# Patient Record
Sex: MALE | Race: WHITE | NOT HISPANIC OR LATINO | Employment: UNEMPLOYED | URBAN - METROPOLITAN AREA
[De-identification: names, ages, dates, MRNs, and addresses within clinical notes are randomized per-mention and may not be internally consistent; named-entity substitution may affect disease eponyms.]

---

## 2022-10-30 ENCOUNTER — HOSPITAL ENCOUNTER (OUTPATIENT)
Facility: CLINIC | Age: 52
Setting detail: OBSERVATION
Discharge: HOME | End: 2022-11-01
Attending: EMERGENCY MEDICINE | Admitting: EMERGENCY MEDICINE
Payer: COMMERCIAL

## 2022-10-30 DIAGNOSIS — F30.10 MANIC BEHAVIOR (H): ICD-10-CM

## 2022-10-30 DIAGNOSIS — F20.0 PARANOID SCHIZOPHRENIA (H): ICD-10-CM

## 2022-10-30 DIAGNOSIS — G47.00 INSOMNIA, UNSPECIFIED TYPE: ICD-10-CM

## 2022-10-30 DIAGNOSIS — Z59.00 HOMELESS: ICD-10-CM

## 2022-10-30 PROBLEM — G44.209 TENSION TYPE HEADACHE: Status: ACTIVE | Noted: 2018-11-15

## 2022-10-30 PROBLEM — R45.851 SUICIDAL IDEATIONS: Status: ACTIVE | Noted: 2018-11-01

## 2022-10-30 PROBLEM — Q86.0 FETAL ALCOHOL SYNDROME: Status: ACTIVE | Noted: 2022-10-30

## 2022-10-30 PROBLEM — F81.9 BASIC LEARNING DISABILITY: Status: ACTIVE | Noted: 2018-11-02

## 2022-10-30 LAB
GLUCOSE BLDC GLUCOMTR-MCNC: 133 MG/DL (ref 70–99)
SARS-COV-2 RNA RESP QL NAA+PROBE: NEGATIVE

## 2022-10-30 PROCEDURE — C9803 HOPD COVID-19 SPEC COLLECT: HCPCS

## 2022-10-30 PROCEDURE — U0003 INFECTIOUS AGENT DETECTION BY NUCLEIC ACID (DNA OR RNA); SEVERE ACUTE RESPIRATORY SYNDROME CORONAVIRUS 2 (SARS-COV-2) (CORONAVIRUS DISEASE [COVID-19]), AMPLIFIED PROBE TECHNIQUE, MAKING USE OF HIGH THROUGHPUT TECHNOLOGIES AS DESCRIBED BY CMS-2020-01-R: HCPCS | Performed by: EMERGENCY MEDICINE

## 2022-10-30 PROCEDURE — 99285 EMERGENCY DEPT VISIT HI MDM: CPT | Mod: 25

## 2022-10-30 RX ORDER — NICOTINE 21 MG/24HR
1 PATCH, TRANSDERMAL 24 HOURS TRANSDERMAL EVERY EVENING
Status: DISCONTINUED | OUTPATIENT
Start: 2022-10-30 | End: 2022-11-01 | Stop reason: HOSPADM

## 2022-10-30 RX ORDER — TRAZODONE HYDROCHLORIDE 50 MG/1
50 TABLET, FILM COATED ORAL AT BEDTIME
Status: DISCONTINUED | OUTPATIENT
Start: 2022-10-30 | End: 2022-11-01 | Stop reason: HOSPADM

## 2022-10-30 SDOH — ECONOMIC STABILITY - HOUSING INSECURITY: HOMELESSNESS UNSPECIFIED: Z59.00

## 2022-10-30 ASSESSMENT — ENCOUNTER SYMPTOMS
FATIGUE: 1
SLEEP DISTURBANCE: 1
HALLUCINATIONS: 0

## 2022-10-30 ASSESSMENT — ACTIVITIES OF DAILY LIVING (ADL)
ADLS_ACUITY_SCORE: 33
ADLS_ACUITY_SCORE: 33

## 2022-10-31 PROCEDURE — G0378 HOSPITAL OBSERVATION PER HR: HCPCS

## 2022-10-31 PROCEDURE — 90791 PSYCH DIAGNOSTIC EVALUATION: CPT

## 2022-10-31 PROCEDURE — 250N000013 HC RX MED GY IP 250 OP 250 PS 637: Performed by: PSYCHIATRY & NEUROLOGY

## 2022-10-31 PROCEDURE — 250N000013 HC RX MED GY IP 250 OP 250 PS 637: Performed by: EMERGENCY MEDICINE

## 2022-10-31 PROCEDURE — 99220 PR INITIAL OBSERVATION CARE,LEVEL III: CPT | Performed by: PSYCHIATRY & NEUROLOGY

## 2022-10-31 RX ORDER — QUETIAPINE FUMARATE 200 MG/1
200 TABLET, FILM COATED ORAL AT BEDTIME
COMMUNITY
End: 2022-11-01

## 2022-10-31 RX ORDER — HYDROXYZINE PAMOATE 25 MG/1
25 CAPSULE ORAL EVERY 4 HOURS PRN
Status: DISCONTINUED | OUTPATIENT
Start: 2022-10-31 | End: 2022-11-01 | Stop reason: HOSPADM

## 2022-10-31 RX ORDER — QUETIAPINE 200 MG/1
TABLET, FILM COATED, EXTENDED RELEASE ORAL
COMMUNITY
Start: 2022-01-13 | End: 2022-10-31

## 2022-10-31 RX ORDER — ASPIRIN 81 MG/1
81 TABLET ORAL DAILY
Status: DISCONTINUED | OUTPATIENT
Start: 2022-10-31 | End: 2022-11-01 | Stop reason: HOSPADM

## 2022-10-31 RX ORDER — LANOLIN ALCOHOL/MO/W.PET/CERES
6 CREAM (GRAM) TOPICAL AT BEDTIME
COMMUNITY

## 2022-10-31 RX ORDER — BENZTROPINE MESYLATE 0.5 MG/1
0.5 TABLET ORAL 2 TIMES DAILY
Status: DISCONTINUED | OUTPATIENT
Start: 2022-10-31 | End: 2022-11-01 | Stop reason: HOSPADM

## 2022-10-31 RX ORDER — TRAZODONE HYDROCHLORIDE 100 MG/1
100 TABLET ORAL AT BEDTIME
COMMUNITY

## 2022-10-31 RX ORDER — HALOPERIDOL 5 MG/1
5 TABLET ORAL EVERY 6 HOURS PRN
Status: DISCONTINUED | OUTPATIENT
Start: 2022-10-31 | End: 2022-11-01 | Stop reason: HOSPADM

## 2022-10-31 RX ORDER — QUETIAPINE 400 MG/1
400 TABLET, FILM COATED, EXTENDED RELEASE ORAL AT BEDTIME
Status: DISCONTINUED | OUTPATIENT
Start: 2022-10-31 | End: 2022-11-01 | Stop reason: HOSPADM

## 2022-10-31 RX ORDER — PANTOPRAZOLE SODIUM 40 MG/1
40 TABLET, DELAYED RELEASE ORAL DAILY
COMMUNITY

## 2022-10-31 RX ORDER — QUETIAPINE 400 MG/1
TABLET, FILM COATED, EXTENDED RELEASE ORAL
COMMUNITY
Start: 2022-07-28 | End: 2022-10-31

## 2022-10-31 RX ORDER — OLANZAPINE 5 MG/1
5 TABLET ORAL EVERY 6 HOURS PRN
Status: DISCONTINUED | OUTPATIENT
Start: 2022-10-31 | End: 2022-10-31

## 2022-10-31 RX ORDER — METFORMIN HCL 500 MG
1000 TABLET, EXTENDED RELEASE 24 HR ORAL 2 TIMES DAILY WITH MEALS
COMMUNITY

## 2022-10-31 RX ORDER — OLANZAPINE 10 MG/1
20 TABLET ORAL AT BEDTIME
Status: DISCONTINUED | OUTPATIENT
Start: 2022-10-31 | End: 2022-10-31

## 2022-10-31 RX ORDER — QUETIAPINE FUMARATE 50 MG/1
50 TABLET, FILM COATED ORAL 4 TIMES DAILY PRN
COMMUNITY
Start: 2022-10-24

## 2022-10-31 RX ORDER — BENZTROPINE MESYLATE 1 MG/1
1 TABLET ORAL 2 TIMES DAILY
COMMUNITY

## 2022-10-31 RX ORDER — HALOPERIDOL 5 MG/1
10 TABLET ORAL 2 TIMES DAILY
COMMUNITY

## 2022-10-31 RX ORDER — CALCIUM CARBONATE 500 MG/1
2 TABLET, CHEWABLE ORAL EVERY 4 HOURS PRN
COMMUNITY

## 2022-10-31 RX ORDER — CLONAZEPAM 0.5 MG/1
0.5 TABLET ORAL 2 TIMES DAILY PRN
Status: DISCONTINUED | OUTPATIENT
Start: 2022-10-31 | End: 2022-11-01 | Stop reason: HOSPADM

## 2022-10-31 RX ADMIN — ASPIRIN 81 MG: 81 TABLET, COATED ORAL at 12:40

## 2022-10-31 RX ADMIN — TRAZODONE HYDROCHLORIDE 50 MG: 50 TABLET ORAL at 21:08

## 2022-10-31 RX ADMIN — QUETIAPINE FUMARATE 400 MG: 400 TABLET, EXTENDED RELEASE ORAL at 21:08

## 2022-10-31 RX ADMIN — BENZTROPINE MESYLATE 0.5 MG: 0.5 TABLET ORAL at 12:40

## 2022-10-31 RX ADMIN — NICOTINE 1 PATCH: 21 PATCH, EXTENDED RELEASE TRANSDERMAL at 12:40

## 2022-10-31 RX ADMIN — BENZTROPINE MESYLATE 0.5 MG: 0.5 TABLET ORAL at 20:46

## 2022-10-31 ASSESSMENT — ACTIVITIES OF DAILY LIVING (ADL)
ADLS_ACUITY_SCORE: 35

## 2022-10-31 NOTE — CONSULTS
"Diagnostic Evaluation Consultation  Crisis Assessment    Patient was assessed: In Person  Patient location: [facility]  Was a release of information signed: Yes. Providers included on the release: Psychiatrist - Ninoska James CNP ([facility])      Referral Data and Chief Complaint  Aguilar is a 52 year old, who uses he/him pronouns, and presents to the ED alone. Patient is referred to the ED by self. Patient is presenting to the ED for the following concerns: difficulty sleeping, hx of schizophrenia.      Informed Consent and Assessment Methods     Patient is his own guardian. Writer met with patient and explained the crisis assessment process, including applicable information disclosures and limits to confidentiality, assessed understanding of the process, and obtained consent to proceed with the assessment. Patient was observed to be able to participate in the assessment as evidenced by verbal consent and engagement. Assessment methods included conducting a formal interview with patient, review of medical records, collaboration with medical staff, and obtaining relevant collateral information from family and community providers when available..     Over the course of this crisis assessment provided reassurance, offered validation, engaged patient in problem solving and disposition planning and worked with patient on safety and aftercare planning. Patient's response to interventions was engaged.      Summary of Patient Situation  Aguilar presented to [facility] due to concerns of poor sleep, feeling \"uneasy,\" crying more frequently. He is currently homeless, had been staying in a hotel in [place] until 3 days ago 10/28/22. Since then he has been on street, \"wandering.\" Yesterday 10/30 he presented to [facility] ED for similar concerns and was discharged to [facility] crisis bed. Aguilar reported when he got to Re-Entry there was \"someone standing there\" and he could not go in. Aguilar was unable to " specify if this was a peer, a staff or potentially a hallucination. He ended up walking away and eventually presented to this ED. He is hoping to rest here and receive resources for [facility] case management.   No concerns for drug use. He denied current suicidal ideation, noted he has attempted suicide in the past (years ago), showed writer old scars on arms, but no current SI. He reported baseline auditory and visual hallucinations that are not currently bothersome to him, no command hallucinations.     Brief Psychosocial History  Aguilar is currently homeless. Minimal natural supports, denied having any family or friends that could be contacted for collateral.   No legal issues identified.   Per chart review, Aguilar has a hx of special ed classes in high school.   See inpt psych note from 11/2018 for further details.     Significant Clinical History  Per chart review, Aguilar has an extensive hx of residential tx, inpt psych admissions (pt reported 66 hospitalizations in 2018 note), hx of commitment to [facility], hx of living at group homes. Chart review also states Aguilar was born with hydrocephalus, but never had a shunt. It appears he moved to the [facility] from [place] around 4-5 years ago. See inpt psych note from 11/2018 for further details.     He is currently established with a psychiatrist at [facility], Ninoska James CNP. However, writer called [facility] and was told Aguilar has not seen Ninoska since December 2021, but has an appt with a new provider (Shital Jiménez) on 11/30/22 (virtual).      Collateral Information  Aguilar denied having any current natural supports, no emergency contact listed. Epic chart was reviewed.  Aguilar did sign RAQUEL for [facility] to confirm current psychiatry services. Writer obtained the following info:  Aguilar reported he is currently established with a psychiatrist at [facility], Ninoska James CNP.  However, writer called [facility] and was told Aguilar has not seen Ninoska since December 2021, but has an appt with a new provider (Shital Jiménez) on 11/30/22 (virtual).      Risk Assessment  ESS-6  1.a. Over the past 2 weeks, have you had thoughts of killing yourself? No  1.b. Have you ever attempted to kill yourself and, if yes, when did this last happen? Yes years ago (2006) via cutting forearm   2. Recent or current suicide plan? No   3. Recent or current intent to act on ideation? No  4. Lifetime psychiatric hospitalization? Yes  5. Pattern of excessive substance use? No  6. Current irritability, agitation, or aggression? No  Scoring note: BOTH 1a and 1b must be yes for it to score 1 point, if both are not yes it is zero. All others are 1 point per number. If all questions 1a/1b - 6 are no, risk is negligible. If one of 1a/1b is yes, then risk is mild. If either question 2 or 3, but not both, is yes, then risk is automatically moderate regardless of total score. If both 2 and 3 are yes, risk is automatically high regardless of total score.      Score: 1, mild risk      Does the patient have access to lethal means? No     Does the patient engage in non-suicidal self-injurious behavior (NSSI/SIB)? no     Does the patient have thoughts of harming others? No     Is the patient engaging in sexually inappropriate behavior?  no        Current Substance Abuse     Is there recent substance abuse? no     Was a urine drug screen or blood alcohol level obtained: No       Mental Status Exam     Affect: Appropriate   Appearance: Appropriate    Attention Span/Concentration: Attentive  Eye Contact: Engaged   Fund of Knowledge: Appropriate    Language /Speech Content: Fluent   Language /Speech Volume: Normal    Language /Speech Rate/Productions: Normal    Recent Memory: Intact   Remote Memory: Intact   Mood: Anxious    Orientation to Person: Yes    Orientation to Place: Yes   Orientation to Time of Day: Yes     Orientation to Date: Yes    Situation (Do they understand why they are here?): Yes    Psychomotor Behavior: Normal    Thought Content: Clear and Hallucinations (at baseline)  Thought Form: Intact      History of commitment: Yes inpt note from 2018 indicates hx of commitment       Medication    Psychotropic medications: Yes. Pt is currently taking see MAR. Medication compliant: No: ran out of medications, looking for refill. Recent medication changes: No    Current Care Team    Primary Care Provider: No  Psychiatrist: Yes. Name: Shital Jiménez CNP. Location: [facility]. Date of last visit: new provider. Frequency: unknown. Perceived helpfulness: unknown.  Therapist: No  : No  CTSS or ARMHS: No  ACT Team: No  Other: No      Diagnosis    295.90  (F20.9) Schizophrenia - by history    Clinical Summary and Substantiation of Recommendations    A lower level of care has been unsuccessful in treating and stabilizing patient s mental health symptoms. Patient will remain on [facility] unit under observation for continued monitoring, treatment and therapeutic intervention of mental health symptoms. Observation at [facility] could help mitigate the need for a more restrictive level of care in an inpatient setting.    Aguilar would benefit from referral to case management. Possibly discharge to crisis bed tomorrow 11/1 for further stabilization and help with CM referral. Pt to follow up with [facility] for established psychiatry services.   Disposition    Recommended disposition: Upon discharge Medication Management and Other: discharge to crisis bed, referral to case management . Remain on obs tonight for stabilization.      Reviewed case and recommendations with attending provider. Attending Name: yes, Darryn Lima MD       Attending concurs with disposition: Yes       Patient concurs with disposition: Yes       Guardian concurs with disposition: NA      Final disposition: obs      Assessment Details    Patient  interview started at: 8:00am and completed at: 8:20am.     Total duration spent on the patient case in minutes: 1.0 hrs      CPT code(s) utilized: 97740 - Psychotherapy for Crisis - 60 (30-74*) min       [place] ISRAEL Jade  DEC - Triage & Transition Services  Callback: [phone]      Aftercare Plan    Follow up with established providers and supports as scheduled. Continue taking medications as prescribed. Abstain from drugs and alcohol. Utilize your [facility] crisis team as needed. They are available 24/7. Contact information is listed below.     Psychiatry  You have an appt with Shital Jiménez CNP at [facility] on 11/30. Shital works out of the [facility], but is able to see you via video appt. You should receive an email with instructions on how to access this appt.       CRISIS BEDS    [facility]  [facility]  [address] [place] [address]/[facility]  (p) [phone]     [facility]  [facility]  [address] [place] [address]/[facility]  (p) [phone]     [facility]  [address] [facility] [address]  (p) [phone]     [facility]   [address] [place] [address]  (p) [phone]    [facility]  [address] [place] [address]  Main Phone: [phone]  Crisis Phone: [phone]    [facility]  [facility]  [address] [place] [address]  Main Phone: [phone]  Crisis Phone: [phone]     Crisis residences are self-referred programs (meaning you can call them on your own and request to go there). The program integrates mental health, medical, and substance use care in a residential, 24-hour, supervised setting for up to 10 days.  Individuals can leave as they please. These services help individuals who are experiencing a mental health crisis or emergency.        If I am feeling unsafe or  I am in a crisis, I will:   Contact my established care providers   Call the National Suicide Prevention Lifeline: 618.283.8292   Go to the nearest emergency room   Call 911     Warning signs that I or other people might notice when a crisis is developing for me: changes to sleep, appetite or mood, increased anger, agitation or irritability, feeling depressed or hopeless, spending more time alone or talking less, increased crying, decreased productivity, seeing or hearing things that aren't there, thoughts of not wanting to live anymore or of actually killing myself, thoughts of hurting others    Things I am able to do on my own to cope or help me feel better: watching a favorite tv show or movie, listening to music I enjoy, going outside and breathing fresh air, going for a walk or exercising, taking a shower or bath, a cold or hot beverage, a healthy snack, drawing/coloring/painting, journaling, singing or dancing, deep breathing     I can try practicing square breathing when I begin to feel anxious - inhale through the nose for the count of 4 and the first line on the square. Exhale through the mouth for the count of 4 for the second line of the square. Repeat to complete the square. Repeat the square as many times as needed.    I can also use my five senses to practice mindfulness and grounding. What are five things I can see, four things I can hear, three things I can feel, two things I can smell, and one thing I can taste.     Things that I am able to do with others to cope or help me feel better: sometimes just talking or spending time with someone else, sharing a meal or having coffee, watching a movie or playing a game, going for a walk or exercising    I can also use community resources including mental health hotlines, [facility] crisis teams, or apps.     Things I can use or do for distraction: movies/tv, music, reading, games, drawing/coloring/painting or other art, essential oils, exercise,  "cleaning/organizing, puzzles, crossword puzzles, word search, Sudoku       I can also download a meditation or relaxation ruma, like Calm, Headspace, or Insight Timer (all three offer a free version)    Changes I can make to support my mental health and wellness: Attend scheduled mental health therapy and psychiatric appointments. Take my medications as prescribed. Maintain a daily schedule/routine. Abstain from all mood altering substances, including drugs, alcohol, or medications not currently prescribed to me. Implement a self-care routine.      People in my life that I can ask for help: friends or family, trusted teachers/staff/colleagues, trusted members of my community or place of [facility], mental health crisis lines, or 911    Your [facility] has a mental health crisis team you can call 24/7: [facility], [phone]    Other things that are important when I m in crisis: to remember that the feelings I am having right now are temporary, and it won't feel like this forever, and that it is okay and important to ask for help    Crisis Lines  Crisis Text Line  Text 721392  You will be connected with a trained live crisis counselor to provide support.    Por espanol, texto  ANA LAURA a 567527 o texto a 442-AYUDAME en WhatsApp    National Hope Line  1.800.SUICIDE [3800414]      Community Resources  Fast Tracker  Linking people to mental health and substance use disorder resources  fasttrackermn.org     [place] Mental Health Warm Line  Peer to peer support  Monday thru Saturday, 12 pm to 10 pm  170.073.0625 or 0.016.924.6171  Text \"Support\" to 72614    National [place] on Mental Illness (NI)  648.298.9702 or 1.888.NI.HELPS      Mental Health Apps  My3  https://my3app.org/    VirtualHopeBox  https://[employer].org/apps/virtual-hope-box/      Additional Information  Today you were seen by a licensed mental health professional through Triage and Transition services, Behavioral Healthcare Providers " ([facility])  for a crisis assessment in the Emergency Department at [facility].  It is recommended that you follow up with your established providers (psychiatrist, mental health therapist, and/or primary care doctor - as relevant) as soon as possible. Coordinators from [facility] will be calling you in the next 24-48 hours to ensure that you have the resources you need.  You can also contact [facility] coordinators directly at [phone]. You may have been scheduled for or offered an appointment with a mental health provider. [facility] maintains an extensive network of licensed behavioral health providers to connect patients with the services they need.  We do not charge providers a fee to participate in our referral network.  We match patients with providers based on a patient's specific needs, insurance coverage, and location.  Our first effort will be to refer you to a provider within your care system, and will utilize providers outside your care system as needed.

## 2022-10-31 NOTE — PROVIDER NOTIFICATION
MD Notification     Notified Person: MD     Notified Person Name: [facility]     Notification Date/Time: 10/30/22 at 11:23 PM     Notification Interaction: Paged [facility] answering      Purpose of Notification: Requested [facility] admission orders     Orders Received:     Comments:

## 2022-10-31 NOTE — ED NOTES
Pt slept uneventfully through the night in recliner. No signs of distress noted. Respirations even and unlabored. Will continue to monitor.

## 2022-10-31 NOTE — ED PROVIDER NOTES
"  History   Chief Complaint:  Insomnia       The history is provided by the patient.      Aguilar Engel is a 52 year old male with history of type II diabetes, hyperlipidemia, and paranoid schizophrenia who presents with insomnia. Aguilar states that he has been unable to sleep for some time, and has been wandering around the streets at night as he is homeless. He describes an intense feeling of fatigue and mental fogginess, but denies any hallucinations or SI. He does note that he normally on Seroquel PRN for anxiety and it normally helps him sleep, but hasn't had access to it for awhile.      Review of Systems   Constitutional: Positive for fatigue.   Psychiatric/Behavioral: Positive for sleep disturbance. Negative for hallucinations and self-injury.        (+) Fogginess   All other systems reviewed and are negative.        Allergies:  Clozapine    Medications:  Seroquel  Clonazepam  Metformin  Sertraline    Past Medical History:     Tension headache  Paranoid schizophrenia  Learning disability  Suicidal ideation  Type II diabetes mellitus  GERD  Elevated LFTs  Hyperlipidemia  Borderline personality disorder  Alcohol abuse  Adjustment disorder  Fetal alcohol syndrome  Depression  SVT  Thrombocytosis  Hypercholesterolemia   H. pylori infection  Dilated aortic root     Past Surgical History:    Inguinal hernia repair  Appendectomy  Left arm fracture  Mole excision     Family History:    Cardiovascular disease - father  Diabetes - father  Hypercholesterolemia - father  Hypertension - mother    Social History:  Patient presents alone.  Patient is currently homeless.  Patient denies alcohol use.  Patient reports smoking one pack per day.  Patient is unaccompanied in the ED.    Physical Exam     Patient Vitals for the past 24 hrs:   BP Temp Temp src Pulse Resp SpO2 Height Weight   10/30/22 2303 131/78 98  F (36.7  C) Oral 53 16 98 % 1.626 m (5' 4\") 70.9 kg (156 lb 4.8 oz)   10/30/22 1929 (!) 142/85 97.8  F (36.6 " " C) Temporal 96 16 97 % 1.626 m (5' 4\") 72.6 kg (160 lb)       Physical Exam    Nursing note and vitals reviewed.  Constitutional:  Alert.  Appears comfortable.   HENT:    Mucous membranes are moist.  Eyes:    Conjunctivae are normal.  Nipples are equal and reactive.     Right eye exhibits no discharge. Left eye exhibits no discharge.   Cardiovascular:  Normal rate, regular rhythm.      Normal heart sounds.     No murmur heard.  Pulmonary/Chest:  Breath sounds clear. No respiratory distress.     No stridor.   Neurological:   Alert and appropriate. No focal weakness.  Gait is normal.  No tremor.  Skin:    Skin is warm and dry. No rash noted. No diaphoresis.   Psychiatric:   Patient has a very flat affect, poor eye contact.  He does not seem delusional or psychotic acutely.  Denies suicidal ideation or hallucinations.    Emergency Department Course     Laboratory:  Labs Ordered and Resulted from Time of ED Arrival to Time of ED Departure   GLUCOSE BY METER - Abnormal       Result Value    GLUCOSE BY METER POCT 133 (*)    COVID-19 VIRUS (CORONAVIRUS) BY PCR - Normal    SARS CoV2 PCR Negative          Emergency Department Course:  Mental Health Risk Assessment      PSS-3    Date and Time Over the past 2 weeks have you felt down, depressed, or hopeless? Over the past 2 weeks have you had thoughts of killing yourself? Have you ever attempted to kill yourself? When did this last happen? User   10/30/22 1931 yes yes yes more than 6 months ago [facility]      C-SSRS ([place])    Date and Time Q1 Wished to be Dead (Past Month) Q2 Suicidal Thoughts (Past Month) Q3 Suicidal Thought Method Q4 Suicidal Intent without Specific Plan Q5 Suicide Intent with Specific Plan Q6 Suicide Behavior (Lifetime) Within the Past 3 Months? RETIRED: Level of Risk per Screen Screening Not Complete User   10/30/22 6260 no no no no no yes -- -- -- AS              Suicide assessment completed by mental health (D.E.C., LCSW, etc.)    Reviewed:  I reviewed " nursing notes, vitals, past medical history and Care Everywhere    Assessments:  2139 I obtained history and examined the patient as noted above.     Interventions:  Ordered Nicotine patch 1 patch TD  Ordered Trazodone 50 mg PO    Disposition:  The patient was transferred to [facility].     Impression & Plan     Medical Decision Making:  Patient comes in with manic symptoms with not sleeping for 8 to 10 days and walking around consistently.  He wants help at this time.  Said he cannot sleep.  He is not suicidal or having hallucinations at this time.  [facility]  His blood glucose was 133 and he is apparently a type II diabetic on metformin.  He also smokes and wants a patch so this is ordered.  COVID is negative.  [facility] and they wanted him to have a dose of trazodone ordered for tonight so 50 mg is ordered for bedtime.  He will go to [facility] for further evaluation and management.      Diagnosis:    ICD-10-CM    1. Paranoid schizophrenia (H)  F20.0       2. Insomnia, unspecified type  G47.00       3. Manic behavior (H)  F30.10       4. Tobacco abuse  Z72.0             Scribe Disclosure:  I, Michelle Lutz, am serving as a scribe at 9:04 PM on 10/30/2022 to document services personally performed by Hue Hair MD based on my observations and the provider's statements to me.        Hue Hair MD  10/30/22 6559

## 2022-10-31 NOTE — ED NOTES
Pt has been quiet for the most part of the shift until late this afternoon when patient was heard talking to self and appears to be internally preoccupied.

## 2022-10-31 NOTE — PHARMACY-ADMISSION MEDICATION HISTORY
Pharmacy Medication History  Admission medication history interview status for the 10/30/2022  admission is complete. See EPIC admission navigator for prior to admission medications     Location of Interview: Outside patient room but on unit  Medication history sources: Pharmacy ([facility])    Significant changes made to the medication list:  All meds added except atorvastatin and loratadine    In the past week, patient estimated taking medication this percent of the time: less than 50% due to other    Additional medication history information:   It is unclear if Aguilar is taking any of his medications.  He was admitted at [facility] from August until he left AMA on 10/24/22.  [facility] filled his psych medications, but did not send him with ASA, atorvastatin or metformin per the pharmacists report. The list was updated to the best of my ability given the information available from the pharmacy.  There is not a discharge summary available to me in Care Everywhere.    Medication reconciliation completed by provider prior to medication history? No    Time spent in this activity: 30 minutes    Prior to Admission medications    Medication Sig Last Dose Taking? Auth Provider Long Term End Date   aspirin 81 MG EC tablet Take 1 tablet (81 mg) by mouth daily Unknown Yes Martin Schaeffer NP     atorvastatin (LIPITOR) 40 MG tablet Take 1 tablet (40 mg) by mouth At Bedtime Unknown Yes Martin Schaeffer NP Yes    benztropine (COGENTIN) 1 MG tablet Take 1 mg by mouth 2 times daily  Yes Unknown, Entered By History No    calcium carbonate (TUMS) 500 MG chewable tablet Take 2 chew tab by mouth every 4 hours as needed for heartburn  Yes Unknown, Entered By History     haloperidol (HALDOL) 5 MG tablet Take 10 mg by mouth 2 times daily  Yes Unknown, Entered By History No    loratadine (CLARITIN) 10 MG tablet Take 1 tablet (10 mg) by mouth daily Unknown Yes Martin Schaeffer NP     melatonin 3 MG tablet Take 6 mg by mouth  At Bedtime  Yes Unknown, Entered By History     metFORMIN (GLUCOPHAGE XR) 500 MG 24 hr tablet Take 1,000 mg by mouth 2 times daily (with meals)  Yes Unknown, Entered By History Yes    pantoprazole (PROTONIX) 40 MG EC tablet Take 40 mg by mouth daily  Yes Unknown, Entered By History     psyllium (METAMUCIL/KONSYL) 58.6 % powder Take 1 teaspoonful by mouth 3 times daily  Yes Unknown, Entered By History     QUEtiapine (SEROQUEL) 200 MG tablet Take 200 mg by mouth At Bedtime  Yes Unknown, Entered By History No    traZODone (DESYREL) 100 MG tablet Take 100 mg by mouth At Bedtime  Yes Unknown, Entered By History     QUEtiapine (SEROQUEL) 50 MG tablet Take 50 mg by mouth 4 times daily as needed   Reported, Patient No        The information provided in this note is only as accurate as the sources available at the time of update(s)

## 2022-10-31 NOTE — ED TRIAGE NOTES
Pt reports he hasnt slept in days, then says he hasnt slept in months, then says he hasnt slept in weeks. Pt takes seroquel. Pt denies feeling suicidal. Pt reports he hasnt seen his PCP for the insomnia   Triage Assessment     Row Name 10/30/22 1929       Triage Assessment (Adult)    Airway WDL WDL       Respiratory WDL    Respiratory WDL WDL       Skin Circulation/Temperature WDL    Skin Circulation/Temperature WDL WDL       Cardiac WDL    Cardiac WDL WDL       Peripheral/Neurovascular WDL    Peripheral Neurovascular WDL WDL       Cognitive/Neuro/Behavioral WDL    Cognitive/Neuro/Behavioral WDL WDL

## 2022-10-31 NOTE — ED PROVIDER NOTES
EmPATH Unit - Initial Psychiatric Observation Note  [facility] Emergency Department  Observation Initiation Date: Oct 30, 2022    Aguilar Engel MRN: [id number]   Age: 52 year old YOB: 1970     History     Chief Complaint   Patient presents with     Insomnia     HPI  Aguilar Engel is a 52 year old male with a past history notable for schizophrenia further complicated by homelessness who presents to the emergency department reporting heightened anxiety in the context of paranoia.  He had initially denied hallucinations.  He was determined to be medically stable and transferred to the EmPATH unit for psychiatric assessment.  On examination, the patient reports paranoid delusions that he is not safe in the community.  He feels as though unknown people are following him.  He alludes to the possibility of voices influencing these concerns although denied command hallucinations.  He denied suicidal and homicidal thoughts.  He has been inconsistent with taking his medications and was able to identify insomnia as a consequence.  He is requesting not to be sent to a crisis home due to paranoid concerns.  He would like to continue his stay on the unit to allow adequate improvement in his mood and thought process.    Past Medical History  No past medical history on file.  No past surgical history on file.  aspirin 81 MG EC tablet  atorvastatin (LIPITOR) 40 MG tablet  benztropine (COGENTIN) 0.5 MG tablet  clonazePAM (KLONOPIN) 0.5 MG tablet  haloperidol (HALDOL) 5 MG tablet  hydrOXYzine (VISTARIL) 25 MG capsule  loratadine (CLARITIN) 10 MG tablet  OLANZapine (ZYPREXA) 20 MG tablet  omeprazole (PRILOSEC) 20 MG capsule  zaleplon (SONATA) 10 MG capsule  alum & mag hydroxide-simethicone (MYLANTA ES/MAALOX  ES) 400-400-40 MG/5ML SUSP  emollient (VANICREAM) cream  psyllium (METAMUCIL) 28.3 % packet      Allergies   Allergen Reactions     Benadryl [Diphenhydramine]      Clozaril [Clozapine]      Family History  No  "family history on file.  Social History   Social History     Tobacco Use     Smoking status: Never   Substance Use Topics     Alcohol use: No     Drug use: No      Past medical history, past surgical history, medications, allergies, family history, and social history were reviewed with the patient. No additional pertinent items.       Review of Systems  A complete review of systems was performed with pertinent positives and negatives noted in the HPI, and all other systems negative.    Physical Examination   BP: (!) 142/85  Pulse: 96  Temp: 97.8  F (36.6  C)  Resp: 16  Height: 162.6 cm (5' 4\")  Weight: 72.6 kg (160 lb)  SpO2: 97 %    Physical Exam  General: Appears stated age.   Neuro: Alert and fully oriented. Extremities appear to demonstrate normal strength on visual inspection.   Integumentary/Skin: no rash visualized, normal color    Psychiatric Examination   Appearance: awake, alert  Attitude:  cooperative  Eye Contact:  poor   Mood:  anxious  Affect:  intensity is flat  Speech:  clear, coherent  Psychomotor Behavior:  tremor observed   Thought Process:  linear  Associations:  no loose associations  Thought Content:  no evidence of suicidal ideation or homicidal ideation, auditory hallucinations present and mild paranoia evident  Insight:  fair  Judgement:  fair  Oriented to:  time, person, and place  Attention Span and Concentration:  fair  Recent and Remote Memory:  fair  Language: able to name/identify objects without impairment  Fund of Knowledge: intact with awareness of current and past events    ED Course        Labs Ordered and Resulted from Time of ED Arrival to Time of ED Departure   GLUCOSE BY METER - Abnormal       Result Value    GLUCOSE BY METER POCT 133 (*)    COVID-19 VIRUS (CORONAVIRUS) BY PCR - Normal    SARS CoV2 PCR Negative         Assessments & Plan (with Medical Decision Making)   Patient presenting with decompensated state of his schizophrenia illness resulting in mild to moderate " paranoia and possible hallucinations. Nursing notes reviewed noting no acute issues.     I have reviewed the assessment completed by the [facility].     During the observation period, the patient did not require medications for agitation, and did not require restraints/seclusion for patient and/or provider safety.     The patient was found to have a psychiatric condition that would benefit from an observation stay in the emergency department for further psychiatric stabilization and/or coordination of a safe disposition. The observation plan includes serial assessments of psychiatric condition, potential administration of medications if indicated, further disposition pending the patient's psychiatric course during the monitoring period.     Preliminary diagnosis:    ICD-10-CM    1. Paranoid schizophrenia (H)  F20.0       2. Insomnia, unspecified type  G47.00       3. Manic behavior (H)  F30.10            Treatment Plan:  -Resume Seroquel XR 400mg nightly for reduction of psychosis  -Continue Haldol however changed to 5 mg twice a day as opposed to scheduled due to the patient reporting possible side effects leading to nonadherence.  -Hydroxyzine as needed for anxiety  -Enter to observation status and reassess tomorrow.    --  Darryn Lima MD   [facility] EMERGENCY DEPT  EmPATH Unit  10/30/2022        Darryn Lima MD  10/31/22 2254

## 2022-10-31 NOTE — ED NOTES
Pt is A/O x 3, woke up this morning with VSS, Pt appears to have some developmental delays as his story is coherent. Pt denies hallucinating to writer but admitted it to the [facility] staff. Pt ordered break fast. Pt is current homeless as he left the hotel he was living in because of out of funds. Pt is calm and cooperative and the plan is sending to to a crisis residence.

## 2022-10-31 NOTE — PROGRESS NOTES
Per ED report:  Aguilar Engel is a 52 year old male with history of type II diabetes, hyperlipidemia, and paranoid schizophrenia who presents with insomnia. Aguilar states that he has been unable to sleep for some time, and has been wandering around the streets at night as he is homeless. He describes an intense feeling of fatigue and mental fogginess, but denies any hallucinations or SI. He does note that he normally on Seroquel PRN for anxiety and it normally helps him sleep, but hasn't had access to it for awhile.  Upon arrival to [facility] pt is calm and cooperative. Pt reports struggling with insomnia. He reports feeling anxious and depressed. He doesn't remember when he took any of his medications last time. He denies any drug use other then occasional cannabinoids and  ETOH. He is willing to provide urine sample.      Nursing and risk assessments completed. Assessments reviewed with LMHP and physician. Admission information reviewed with patient. Patient given a tour of [facility] and instructions on using the facility. [facility]. Pt safety search completed.

## 2022-10-31 NOTE — DISCHARGE INSTRUCTIONS
Aftercare Plan    Follow up with established providers and supports as scheduled. Continue taking medications as prescribed. Abstain from drugs and alcohol. Utilize your [facility] mental health crisis team as needed. They are available 24/7. Contact information is listed below.     Psychiatry  You have an appt with Shital Jiménez CNP at [facility] on 11/30. Shital works out of the [facility], but is able to see you via video appt. You should receive an email with instructions on how to access this appt.     CRISIS BEDS    [facility]  [employer]  [address] [place] [address]/[facility]  (p) [phone]     [facility]  [employer]  [address] [place] [address]/[facility]  (p) [phone]     [facility]  [address] [facility] [address]  (p) [phone]     [facility]   [address] [place] [address]  (p) [phone]    [facility]  [address] [place] [address]  Main Phone: [phone]  Crisis Phone: [phone]    [facility]  [address] [place] [address]  Main Phone: [phone]  Crisis Phone: [phone]     Crisis residences are self-referred programs (meaning you can call them on your own and request to go there). The program integrates mental health, medical, and substance use care in a residential, 24-hour, supervised setting for up to 10 days.  Individuals can leave as they please. These services help individuals who are experiencing a mental health crisis or emergency.        If I am feeling unsafe or I am in a crisis, I will:   Contact my established care providers   Call the National Suicide Prevention Lifeline: 700.377.8851   Go to the nearest emergency room   Call 812     Warning signs that I or other people might notice when a crisis is developing for me: changes to sleep, appetite or  mood, increased anger, agitation or irritability, feeling depressed or hopeless, spending more time alone or talking less, increased crying, decreased productivity, seeing or hearing things that aren't there, thoughts of not wanting to live anymore or of actually killing myself, thoughts of hurting others    Things I am able to do on my own to cope or help me feel better: watching a favorite tv show or movie, listening to music I enjoy, going outside and breathing fresh air, going for a walk or exercising, taking a shower or bath, a cold or hot beverage, a healthy snack, drawing/coloring/painting, journaling, singing or dancing, deep breathing     I can try practicing square breathing when I begin to feel anxious - inhale through the nose for the count of 4 and the first line on the square. Exhale through the mouth for the count of 4 for the second line of the square. Repeat to complete the square. Repeat the square as many times as needed.    I can also use my five senses to practice mindfulness and grounding. What are five things I can see, four things I can hear, three things I can feel, two things I can smell, and one thing I can taste.     Things that I am able to do with others to cope or help me feel better: sometimes just talking or spending time with someone else, sharing a meal or having coffee, watching a movie or playing a game, going for a walk or exercising    I can also use community resources including mental health hotlines, Critical access hospital crisis teams, or apps.     Things I can use or do for distraction: movies/tv, music, reading, games, drawing/coloring/painting or other art, essential oils, exercise, cleaning/organizing, puzzles, crossword puzzles, word search, Sudoku       I can also download a meditation or relaxation ruma, like Calm, Headspace, or Insight Timer (all three offer a free version)    Changes I can make to support my mental health and wellness: Attend scheduled mental health therapy and  "psychiatric appointments. Take my medications as prescribed. Maintain a daily schedule/routine. Abstain from all mood altering substances, including drugs, alcohol, or medications not currently prescribed to me. Implement a self-care routine.      People in my life that I can ask for help: friends or family, trusted teachers/staff/colleagues, trusted members of my community or place of [place], mental health crisis lines, or 911    Your [facility] has a mental health crisis team you can call 24/7: [facility], [phone]    Other things that are important when I m in crisis: to remember that the feelings I am having right now are temporary, and it won't feel like this forever, and that it is okay and important to ask for help    Crisis Lines  Crisis Text Line  Text 490273  You will be connected with a trained live crisis counselor to provide support.    Por maribell, texto  ANA LAURA a 141476 o texto a 442-AYUDAME en WhatsApp    National Hope Line  1.800.SUICIDE [5932724]      Community Resources  Fast Tracker  Linking people to mental health and substance use disorder resources  fasttrackermn.org     [place] Mental Health Warm Line  Peer to peer support  Monday thru Saturday, 12 pm to 10 pm  571.525.2917 or 3.066.085.5916  Text \"Support\" to 90480    National [place] on Mental Illness (NI)  784.596.1590 or 1.888.NI.HELPS      Mental Health Apps  My3  https://myNEXTA Mediapp.org/    VirtualHopeBox  https://Stepsss.org/apps/virtual-hope-box/      Additional Information  Today you were seen by a licensed mental health professional through Triage and Transition services, [facility] (P)  for a crisis assessment in the Emergency Department at [facility].  It is recommended that you follow up with your established providers (psychiatrist, mental health therapist, and/or primary care doctor - as relevant) as soon as possible. Coordinators from [facility] will be calling you in the " next 24-48 hours to ensure that you have the resources you need.  You can also contact [facility] coordinators directly at [phone]. You may have been scheduled for or offered an appointment with a mental health provider. [facility] maintains an extensive network of licensed behavioral health providers to connect patients with the services they need.  We do not charge providers a fee to participate in our referral network.  We match patients with providers based on a patient's specific needs, insurance coverage, and location.  Our first effort will be to refer you to a provider within your care system, and will utilize providers outside your care system as needed.

## 2022-11-01 VITALS
HEART RATE: 88 BPM | DIASTOLIC BLOOD PRESSURE: 75 MMHG | SYSTOLIC BLOOD PRESSURE: 108 MMHG | HEIGHT: 64 IN | WEIGHT: 156.3 LBS | RESPIRATION RATE: 16 BRPM | OXYGEN SATURATION: 98 % | BODY MASS INDEX: 26.69 KG/M2 | TEMPERATURE: 97.2 F

## 2022-11-01 PROBLEM — Z59.00 HOMELESS: Status: ACTIVE | Noted: 2022-11-01

## 2022-11-01 PROCEDURE — 250N000013 HC RX MED GY IP 250 OP 250 PS 637: Performed by: PSYCHIATRY & NEUROLOGY

## 2022-11-01 PROCEDURE — 99217 PR OBSERVATION CARE DISCHARGE: CPT | Performed by: PSYCHIATRY & NEUROLOGY

## 2022-11-01 PROCEDURE — G0378 HOSPITAL OBSERVATION PER HR: HCPCS

## 2022-11-01 RX ORDER — QUETIAPINE 400 MG/1
400 TABLET, FILM COATED, EXTENDED RELEASE ORAL AT BEDTIME
Qty: 10 TABLET | Refills: 0 | Status: SHIPPED | OUTPATIENT
Start: 2022-11-01

## 2022-11-01 RX ADMIN — BENZTROPINE MESYLATE 0.5 MG: 0.5 TABLET ORAL at 08:52

## 2022-11-01 RX ADMIN — OMEPRAZOLE 20 MG: 20 CAPSULE, DELAYED RELEASE ORAL at 08:52

## 2022-11-01 RX ADMIN — ASPIRIN 81 MG: 81 TABLET, COATED ORAL at 08:52

## 2022-11-01 ASSESSMENT — ACTIVITIES OF DAILY LIVING (ADL)
ADLS_ACUITY_SCORE: 35

## 2022-11-01 NOTE — ED NOTES
Pt will stay on OBS and be reassess tomorrow. Pt was observed talking and laughing to himself and appears internally preoccupied. Pt was med compliant and went to sleep.

## 2022-11-01 NOTE — PROGRESS NOTES
Patient continues to sleep in recliner. Changing positions occasionally. Chest rise and fall noted. Respirations WNL.

## 2022-11-01 NOTE — ED NOTES
Pt is awake, calm and pleasant. VSS and pt stated that he had a good night, slept well. Pt woke up this morning and drank a cup of coffee and went back to bed.

## 2022-11-01 NOTE — ED PROVIDER NOTES
"EmPATH Unit - Psychiatric Observation Discharge Summary  [facility] Emergency Department  Discharge Date: 11/1/2022    Aguilar Engel MRN: [id number]   Age: 52 year old YOB: 1970     Brief HPI & Initial ED Course     Chief Complaint   Patient presents with     Insomnia     HPI  Aguilar Engel is a 52 year old male with history notable for schizophrenia further complicated by homelessness.  He has been in the emergency department for a little over 40 hours now.  Overnight, there were no acute issues.  On reassessment today, the patient reports sleeping very well last night.  Today, his mood and thought clarity are reported to have improved.  He denied suicidal and homicidal thoughts.  He denied auditory and visual hallucinations.  He is declining referrals to a crisis facility in favor of discharging back to the community.        Physical Examination   BP: 108/75  Pulse: 88  Temp: 97.2  F (36.2  C)  Resp: 16  Height: 162.6 cm (5' 4\")  Weight: 70.9 kg (156 lb 4.8 oz)  SpO2: 98 %    Physical Exam  General: Appears stated age.   Neuro: Alert and fully oriented. Extremities appear to demonstrate normal strength on visual inspection.   Integumentary/Skin: no rash visualized, normal color    Psychiatric Examination   Appearance: awake, alert  Attitude:  cooperative  Eye Contact:  fair  Mood:  better  Affect:  appropriate and in normal range  Speech:  clear, coherent  Psychomotor Behavior:  no evidence of tardive dyskinesia, dystonia, or tics  Thought Process:  logical and linear  Associations:  no loose associations  Thought Content:  no evidence of suicidal ideation or homicidal ideation and no evidence of psychotic thought  Insight:  fair  Judgement:  intact  Oriented to:  time, person, and place  Attention Span and Concentration:  fair  Recent and Remote Memory:  fair  Language: able to name/identify objects without impairment  Fund of Knowledge: intact with awareness of current and past " events    Results        Labs Ordered and Resulted from Time of ED Arrival to Time of ED Departure   GLUCOSE BY METER - Abnormal       Result Value    GLUCOSE BY METER POCT 133 (*)    COVID-19 VIRUS (CORONAVIRUS) BY PCR - Normal    SARS CoV2 PCR Negative         Observation Course   The patient was found to have a psychiatric condition that would benefit from an observation stay in the emergency department for further psychiatric stabilization and/or coordination of a safe disposition. The plan upon observation admission included serial assessments of psychiatric condition, potential administration of medications if indicated, further disposition pending the patient's psychiatric course during the monitoring period.     Serial assessments of the patient's psychiatric condition were performed. Nursing notes were reviewed. During the observation period, the patient did not require medications for agitation, and did not require restraints/seclusion for patient and/or provider safety.     After a period of working with the treatment team on the EmPATH unit, the patient's mental state improved to allow a safe transition to outpatient care. After counseling on the diagnosis, work-up, and treatment plan, the patient was discharged. Close follow-up with a psychiatrist and/or therapist was recommended and community psychiatric resources were provided. Patient is to return to the ED if any urgent or potentially life-threatening concerns.     Discharge Diagnoses:   Final diagnoses:   Paranoid schizophrenia (H)   Insomnia, unspecified type   Manic behavior (H)   Homeless       Treatment Plan:  -Continue Seroquel  mg nightly for treatment of schizophrenia  -Resume outpatient medication management appointments  -Resources for community shelters  -The patient is declining transfer to a crisis facility today in favor of discharging back to the community.  We will proceed with discharge today.      At the time of discharge,  the patient's acute suicide risk was determined to be low due to the following factors: Reduction in the intensity of mood/anxiety symptoms that preceded the admission, denial of suicidal thoughts, denies feeling helpless or helpless, not currently under the influence of alcohol or illicit substances, denies experiencing command hallucinations, no immediate access to firearms. The patient's acute risk could be higher if noncompliant with their treatment plan, medications, follow-up appointments or using illicit substances or alcohol. Protective factors are limited.    --  Darryn Lima MD  [facility] EMERGENCY DEPT  EmPATH Unit  11/1/2022      Darryn Lima MD  11/01/22 0798

## 2022-11-01 NOTE — ED NOTES
"[facility] Crisis Reassessment      Aguilar Engel was reassessed due to observation status at [facility]. Pt was first seen on 11/1/22 by ISRAEL Hooks; see the initial assessment note for details.      Patient Presentation    Initial ED presentation details: difficulty sleeping, homeless, hx of schizophrenia    Current patient presentation: Aguilar reported he slept well last night and is feeling better this morning. He requested to remain at [facility] for \"a few more days.\" Writer informed him that a crisis bed at [facility] was available and he would not be able to remain at [facility] only to sleep. He then declined crisis bed, requesting to discharge to care of self instead. He declined any additional resources or referrals from writer, repeatedly stating that he will \"just leave and figure it out, I'll just walk.\" Writer reminded him [facility] is still available and provided info on how to access crisis beds from the community.   No current SI or other safety risks are present.     Changes observed since initial assessment: Pt reported a reduction in intensity of presenting mental health concerns.       Risk of Harm    Repeat ESS-6: Date of Completion 11/1/22  1.a. Over the past 2 weeks, have you had thoughts of killing yourself? No  1.b. Have you ever attempted to kill yourself and, if yes, when did this last happen? Yes years ago (2006) via cutting forearm   2. Recent or current suicide plan? No   3. Recent or current intent to act on ideation? No  4. Lifetime psychiatric hospitalization? Yes  5. Pattern of excessive substance use? No  6. Current irritability, agitation, or aggression? No  Scoring note: BOTH 1a and 1b must be yes for it to score 1 point, if both are not yes it is zero. All others are 1 point per number. If all questions 1a/1b - 6 are no, risk is negligible. If one of 1a/1b is yes, then risk is mild. If either question 2 or 3, but not both, is yes, then risk is automatically moderate regardless " of total score. If both 2 and 3 are yes, risk is automatically high regardless of total score.      Score: 1, mild risk    Is the patient experiencing current suicidal ideation: No    Does the patient have thoughts of harming others? No      Does the patient have access to lethal means? No     Does the patient engage in non-suicidal self-injurious behavior (NSSI/SIB)? no     Does the patient have thoughts of harming others? No    Mental Status Exam   Affect: Appropriate   Appearance: Appropriate    Attention Span/Concentration: Attentive?    Eye Contact: Engaged   Fund of Knowledge: Appropriate    Language /Speech Content: Fluent   Language /Speech Volume: Normal    Language /Speech Rate/Productions: Normal    Recent Memory: Intact   Remote Memory: Intact   Mood: Normal  Orientation to Person: Yes    Orientation to Place: Yes   Orientation to Time of Day: Yes    Orientation to Date: Yes    Situation (Do they understand why they are here?): Yes    Psychomotor Behavior: Normal , pacing slowly on unit  Thought Content: Clear   Thought Form: Goal Directed       Additional Collateral Information   n/a      Therapeutic Intervention  The following therapeutic methodologies were employed when working with the patient: Establishing rapport, Identify additional supports and alternative coping skills, Establish a discharge plan, Motivational Interviewing and Safety planning. Patient response to intervention: minimally engaged, slightly evasive with writer, but participated.    Diagnosis:   295.90  (F20.9) Schizophrenia - by history    Clinical Substantiation of Recommendations  After the period in observation care, the patient's circumstances and mental state were safe for outpatient management. Aguilar denied current SI, no other safety concerns identified. He has displayed a hx of help-seeking behavior by presenting to EDs in times of need. He was provided information on how to access crisis beds from the community as  needed. After therapeutic treatment, intervention and aftercare planning by [facility] care team and consultation with attending provider, the patient was discharged. Close follow-up with a psychiatrist and/or therapist was recommended and community psychiatric resources were provided. Patient is to return to the ED if any urgent or potentially life-threatening concerns arise.       At the time of discharge, the patient's acute suicide risk was determined to be low due to the following factors: reduction in the intensity of mood/anxiety symptoms that preceded the admission, denial of suicidal thoughts, denies feeling helpless or hopeless, not currently under the influence of alcohol or illicit substances, denies experiencing command hallucinations and no immediate access to firearms. Protective factors include: voluntarily seeking mental health support, displays resiliency , established relationship community mental health provider(s) and future focused thinking    Plan:    Disposition  Recommended disposition: Individual Therapy, Medication Management and Other: crisis bed and case management      Reviewed case and recommendations with attending provider. Attending Name: yes, Darryn Lima MD      Attending concurs with disposition: Yes      Patient concurs with disposition: No - Aguilar requested to remain at [facility], when offered crisis bed he declined and stated he would prefer to discharge to self. He declined referral to  case management and individual therapy.     Final disposition: Medication management. Aguilar has previously scheduled psych appt on 11/30.         Assessment Details  Total duration spent on the patient case in minutes: .50 hrs     CPT code(s) utilized: 55297 - Psychotherapy (with patient) - 30 (16-37*) min       ISRAEL Hooks, [facility]  Callback: [phone]      Aftercare Plan     Follow up with established providers and supports as scheduled. Continue taking medications as  prescribed. Abstain from drugs and alcohol. Utilize your [facility] mental health crisis team as needed. They are available 24/7. Contact information is listed below.      Psychiatry  You have an appt with Shital Jiménez CNP at [facility] on 11/30. Shital works out of the [facility], but is able to see you via video appt. You should receive an email with instructions on how to access this appt.         CRISIS BEDS     [facility]  [employer]  [address] [place] [address]/[facility]  (p) [phone]      [facility]  [employer]  [address] [place] [address]/[facility]  (p) [phone]      [facility]  [address] [facility] [address]  (p) [phone]      [facility]   [address] [place] [address]  (p) [phone]     [facility]  [address] [place] [address]  Main Phone: [phone]  Crisis Phone: [phone]     [facility]  [employer]  [address] [place] [address]  Main Phone: [phone]  Crisis Phone: [phone]     Crisis residences are self-referred programs (meaning you can call them on your own and request to go there). The program integrates mental health, medical, and substance use care in a residential, 24-hour, supervised setting for up to 10 days.  Individuals can leave as they please. These services help individuals who are experiencing a mental health crisis or emergency.          If I am feeling unsafe or I am in a crisis, I will:   Contact my established care providers   Call the National Suicide Prevention Lifeline: 731.478.6993   Go to the nearest emergency room   Call 408      Warning signs that I or other people might notice when a crisis is developing for me: changes to sleep, appetite or mood, increased anger, agitation or irritability, feeling depressed or hopeless, spending more  time alone or talking less, increased crying, decreased productivity, seeing or hearing things that aren't there, thoughts of not wanting to live anymore or of actually killing myself, thoughts of hurting others     Things I am able to do on my own to cope or help me feel better: watching a favorite tv show or movie, listening to music I enjoy, going outside and breathing fresh air, going for a walk or exercising, taking a shower or bath, a cold or hot beverage, a healthy snack, drawing/coloring/painting, journaling, singing or dancing, deep breathing      I can try practicing square breathing when I begin to feel anxious - inhale through the nose for the count of 4 and the first line on the square. Exhale through the mouth for the count of 4 for the second line of the square. Repeat to complete the square. Repeat the square as many times as needed.     I can also use my five senses to practice mindfulness and grounding. What are five things I can see, four things I can hear, three things I can feel, two things I can smell, and one thing I can taste.      Things that I am able to do with others to cope or help me feel better: sometimes just talking or spending time with someone else, sharing a meal or having coffee, watching a movie or playing a game, going for a walk or exercising     I can also use community resources including mental health hotlines, [facility] crisis teams, or apps.      Things I can use or do for distraction: movies/tv, music, reading, games, drawing/coloring/painting or other art, essential oils, exercise, cleaning/organizing, puzzles, crossword puzzles, word search, Sudoku       I can also download a meditation or relaxation ruma, like Calm, Headspace, or Insight Timer (all three offer a free version)     Changes I can make to support my mental health and wellness: Attend scheduled mental health therapy and psychiatric appointments. Take my medications as prescribed. Maintain a daily  "schedule/routine. Abstain from all mood altering substances, including drugs, alcohol, or medications not currently prescribed to me. Implement a self-care routine.       People in my life that I can ask for help: friends or family, trusted teachers/staff/colleagues, trusted members of my community or place of Christianity, mental health crisis lines, or 911     Your [facility] has a mental health crisis team you can call 24/7: [facility], [phone]     Other things that are important when I m in crisis: to remember that the feelings I am having right now are temporary, and it won't feel like this forever, and that it is okay and important to ask for help     Crisis Lines  Crisis Text Line  Text 503731  You will be connected with a trained live crisis counselor to provide support.     Por maribell, texto  ANA LAURA a 369196 o texto a 442-AYUDAME en WhatsApp     National Hope Line  1.800.SUICIDE [3046407]        Community Resources  Fast Tracker  Linking people to mental health and substance use disorder resources  [employer] TVn.org      [place] Mental Health Warm Line  Peer to peer support  Monday thru Saturday, 12 pm to 10 pm  113.741.4386 or 6.952.840.9635  Text \"Support\" to 54808     National [employer] on Mental Illness (NI)  707.571.6644 or 1.888.NI.HELPS        Mental Health Apps  My3  https://myTrackingPointpp.org/     VirtualHopeBox  https://Keelvar.org/apps/virtual-hope-box/        Additional Information  Today you were seen by a licensed mental health professional through Triage and Transition services, [facility]  for a crisis assessment in the Emergency Department at [facility].  It is recommended that you follow up with your established providers (psychiatrist, mental health therapist, and/or primary care doctor - as relevant) as soon as possible. Coordinators from P will be calling you in the next 24-48 hours to ensure that you have the resources you need.  " You can also contact [facility] coordinators directly at [phone]. You may have been scheduled for or offered an appointment with a mental health provider. [facility] maintains an extensive network of licensed behavioral health providers to connect patients with the services they need.  We do not charge providers a fee to participate in our referral network.  We match patients with providers based on a patient's specific needs, insurance coverage, and location.  Our first effort will be to refer you to a provider within your care system, and will utilize providers outside your care system as needed.

## 2022-11-01 NOTE — PROGRESS NOTES
Patient disagree to discharge plan. Preferred to discharge to street instead of going to various optional crisis residence. Discharge instructions reviewed with patient including follow-up care plan. Medications: 10 tabs of Seroquel from [place] pharmacy sent with pt  and referrals to community services. Reviewed safety plan and outpatient resources. Denies SI and HI. All belongings that were brought into the hospital have been returned to patient. Escorted off the unit at door six accompanied by [facility] staff. Discharged to unknown destination via possible bus as bus token were provided to pt. Pt appeared irritable when leaving the unit with unpleasant affect..

## 2022-11-03 ENCOUNTER — PATIENT OUTREACH (OUTPATIENT)
Dept: CARE COORDINATION | Facility: CLINIC | Age: 52
End: 2022-11-03

## 2022-11-03 NOTE — PROGRESS NOTES
[facility]    Background: Transitional Care Management program identified per system criteria and reviewed by [facility] team for possible outreach.    Assessment: Upon chart review, [facility] Team member will not proceed with patient outreach related to this episode of Transitional Care Management program due to reason below:    Patient does not have a valid contact number listed    Plan: Transitional Care Management episode addressed appropriately per reason noted above.      PHI Chase  [facility], [facility]    *Connected Care Resource Team does NOT follow patient ongoing. Referrals are identified based on internal discharge reports and the outreach is to ensure patient has an understanding of their discharge instructions.